# Patient Record
Sex: FEMALE | Race: ASIAN | NOT HISPANIC OR LATINO | Employment: FULL TIME | URBAN - METROPOLITAN AREA
[De-identification: names, ages, dates, MRNs, and addresses within clinical notes are randomized per-mention and may not be internally consistent; named-entity substitution may affect disease eponyms.]

---

## 2024-04-23 ENCOUNTER — HOSPITAL ENCOUNTER (EMERGENCY)
Facility: MEDICAL CENTER | Age: 49
End: 2024-04-23
Attending: EMERGENCY MEDICINE
Payer: COMMERCIAL

## 2024-04-23 VITALS
HEART RATE: 77 BPM | HEIGHT: 63 IN | TEMPERATURE: 97.3 F | DIASTOLIC BLOOD PRESSURE: 82 MMHG | SYSTOLIC BLOOD PRESSURE: 138 MMHG | WEIGHT: 140.65 LBS | RESPIRATION RATE: 16 BRPM | OXYGEN SATURATION: 96 % | BODY MASS INDEX: 24.92 KG/M2

## 2024-04-23 DIAGNOSIS — K08.89 PAIN, DENTAL: ICD-10-CM

## 2024-04-23 DIAGNOSIS — K04.7 DENTAL INFECTION: ICD-10-CM

## 2024-04-23 PROCEDURE — 700102 HCHG RX REV CODE 250 W/ 637 OVERRIDE(OP): Performed by: EMERGENCY MEDICINE

## 2024-04-23 PROCEDURE — 99283 EMERGENCY DEPT VISIT LOW MDM: CPT

## 2024-04-23 PROCEDURE — A9270 NON-COVERED ITEM OR SERVICE: HCPCS | Performed by: EMERGENCY MEDICINE

## 2024-04-23 RX ORDER — AMOXICILLIN AND CLAVULANATE POTASSIUM 875; 125 MG/1; MG/1
1 TABLET, FILM COATED ORAL ONCE
Status: COMPLETED | OUTPATIENT
Start: 2024-04-23 | End: 2024-04-23

## 2024-04-23 RX ORDER — AMOXICILLIN AND CLAVULANATE POTASSIUM 875; 125 MG/1; MG/1
1 TABLET, FILM COATED ORAL 2 TIMES DAILY
Qty: 14 TABLET | Refills: 0 | Status: ACTIVE | OUTPATIENT
Start: 2024-04-23 | End: 2024-04-30

## 2024-04-23 RX ORDER — HYDROCODONE BITARTRATE AND ACETAMINOPHEN 5; 325 MG/1; MG/1
1 TABLET ORAL EVERY 4 HOURS PRN
Qty: 15 TABLET | Refills: 0 | Status: SHIPPED | OUTPATIENT
Start: 2024-04-23 | End: 2024-04-28

## 2024-04-23 RX ORDER — HYDROCODONE BITARTRATE AND ACETAMINOPHEN 5; 325 MG/1; MG/1
1 TABLET ORAL ONCE
Status: COMPLETED | OUTPATIENT
Start: 2024-04-23 | End: 2024-04-23

## 2024-04-23 RX ADMIN — AMOXICILLIN AND CLAVULANATE POTASSIUM 1 TABLET: 875; 125 TABLET, FILM COATED ORAL at 20:48

## 2024-04-23 RX ADMIN — HYDROCODONE BITARTRATE AND ACETAMINOPHEN 1 TABLET: 5; 325 TABLET ORAL at 20:48

## 2024-04-24 NOTE — ED PROVIDER NOTES
"  ER Provider Note    Scribed for Trey Mensah M.D. by Vidhya Valenzuela. 4/23/2024   8:19 PM    Primary Care Provider: None noted    CHIEF COMPLAINT  Chief Complaint   Patient presents with    Dental Pain     EXTERNAL RECORDS REVIEWED  No prior records for review.     HPI/ROS    OUTSIDE HISTORIAN(S):  Family    Lilly Gomes is a 48 y.o. female who presents to the ED for evaluation of right top and bottom dental pain onset 2 days ago. Patient states she attempted to gargle salt water which exacerbated her pain. She denies any pain when swallowing. She states she is unsure if it is her teeth or her gums.      PAST MEDICAL HISTORY  None noted    SURGICAL HISTORY  None noted    FAMILY HISTORY  None noted    SOCIAL HISTORY   None noted    CURRENT MEDICATIONS  None noted    ALLERGIES  None noted     PHYSICAL EXAM  /84   Pulse 69   Temp 36.1 °C (96.9 °F) (Temporal)   Resp 16   Ht 1.6 m (5' 3\")   Wt 63.8 kg (140 lb 10.5 oz)   SpO2 98%   BMI 24.92 kg/m²    Constitutional: Awake, alert, in no acute distress, Non-toxic appearance.   HENT: Mucus membranes moist.  Oropharynx is clear. Scattered dental carries and tenderness over the right posterior molars.  Tongue is normal.  Floor of the mouth is normal.  Submental space is soft.  Posterior pharynx is normal.  Patient is tolerating secretions without difficulty. There is no evidence of Tony's Angina.  Eyes: PERRL, EOMI, conjunctiva moist, noninjected.  Neck: Nontender, Normal range of motion, No nuchal rigidity, No stridor.   Lymphatic: No lymphadenopathy noted.   Cardiovascular: Regular rate and rhythm, no murmurs, rubs, gallops.  Thorax & Lungs:  Good breath sounds bilaterally, no wheezes, rales, or retractions.  No chest tenderness.   Abdomen: Soft, nontender, nondistended  Extremities: No edema, No tenderness.   Skin: Warm, Dry, No rashes.   Neurologic: Alert, sensory and motor function normal. No focal deficits.   Psychiatric: Affect normal, Judgment " normal, Mood normal. Appropriate for clinical situation     INITIAL ASSESSMENT AND PLAN    8:19 PM - Patient was evaluated at bedside for dental pain. The patient will be medicated with Norco 5-325 mg and Augmentin 875-125 mg for her symptoms. I informed her of the plan for discharge home with antibiotics and pain medications. She was instructed to follow up with a dentist regarding today's visit. Patient verbalizes understanding and support with my plan of care.            DISPOSITION AND DISCUSSIONS     Escalation of care considered, and ultimately not performed: diagnostic imaging.    Barriers to care at this time, including but not limited to: Patient does not have established PCP.     Decision tools and prescription drugs considered including, but not limited to: Antibiotics and narcotics.    I reviewed prescription monitoring program for patient's narcotic use before prescribing a scheduled drug.The patient will not drink alcohol nor drive with prescribed medications. The patient will return for new or worsening symptoms and is stable at the time of discharge.    The patient is referred to a primary physician for blood pressure management, diabetic screening, and for all other preventative health concerns.    In prescribing controlled substances to this patient, I certify that I have obtained and reviewed the medical history of Lilly Gomes. I have also made a good tres effort to obtain applicable records from other providers who have treated the patient and records did not demonstrate any increased risk of substance abuse that would prevent me from prescribing controlled substances.     I have conducted a physical exam and documented it. I have reviewed Ms. Gomes’s prescription history as maintained by the [place] Prescription Monitoring Program.     I have assessed the patient’s risk for abuse, dependency, and addiction using the validated Opioid Risk Tool available at  https://www.mdcalc.com/cccfbp-wytj-lcqj-ort-narcotic-abuse.     Given the above, I believe the benefits of controlled substance therapy outweigh the risks. The reasons for prescribing controlled substances include non-narcotic, oral analgesic alternatives have been inadequate for pain control. Accordingly, I have discussed the risk and benefits, treatment plan, and alternative therapies with the patient.     DISPOSITION:  Patient will be discharged home in stable condition.    FOLLOW UP:  [facility], Emergency Dept  [facility] [facility] [id number] [phone]    If symptoms worsen      OUTPATIENT MEDICATIONS:  New Prescriptions    AMOXICILLIN-CLAVULANATE (AUGMENTIN) 875-125 MG TAB    Take 1 Tablet by mouth 2 times a day for 7 days.    HYDROCODONE-ACETAMINOPHEN (NORCO) 5-325 MG TAB PER TABLET    Take 1 Tablet by mouth every four hours as needed (pain) for up to 5 days.       FINAL DIAGNOSIS  1. Pain, dental    2. Dental infection         Vidhya BHAT (Scribe), am scribing for, and in the presence of, Trey Mensah M.D..    Electronically signed by: Vidhya Valenzuela (Scribe), 4/23/2024    Trey BHAT M.D. personally performed the services described in this documentation, as scribed by Vidhya Valenzuela in my presence, and it is both accurate and complete.      The note accurately reflects work and decisions made by me.  Trey Mensah M.D.  4/23/2024  8:52 PM

## 2024-04-24 NOTE — ED TRIAGE NOTES
.  Chief Complaint   Patient presents with    Dental Pain     Pt amb to triage for above.  Pt c/o of 'gum pain' to the R side, since Saturday. Increasing pain x2days.   Denies pain when swallowing. Airway patent. States feels sensitive when drinking water' Pt states had top & bottom wisdom teeth removed to R side, 1.5 yr ago.   No obvious abscess visible in triage, no swelling.     Pt has had 2 doses of cephalexin today.   Pt states ' hasn't seen dentist in a while'  Denies any other recent mouth surgery     Pt Aox4, GCS15 and speaking in complete sentences. Educated on triage process and to alert staff if anything changes.